# Patient Record
Sex: FEMALE | Race: WHITE
[De-identification: names, ages, dates, MRNs, and addresses within clinical notes are randomized per-mention and may not be internally consistent; named-entity substitution may affect disease eponyms.]

---

## 2020-12-15 ENCOUNTER — HOSPITAL ENCOUNTER (OUTPATIENT)
Dept: HOSPITAL 47 - RADMAMWWP | Age: 60
Discharge: HOME | End: 2020-12-15
Attending: FAMILY MEDICINE
Payer: COMMERCIAL

## 2020-12-15 DIAGNOSIS — Z12.31: Primary | ICD-10-CM

## 2020-12-15 DIAGNOSIS — Z80.3: ICD-10-CM

## 2020-12-15 PROCEDURE — 77063 BREAST TOMOSYNTHESIS BI: CPT

## 2020-12-15 PROCEDURE — 77067 SCR MAMMO BI INCL CAD: CPT

## 2020-12-16 NOTE — MM
Reason for exam: screening  (asymptomatic).

Last mammogram was performed 6 years ago.



History:

Patient is postmenopausal.

Family history of breast cancer in mother at age 45.



Physical Findings:

A clinical breast exam by your physician is recommended on an annual basis and 

results should be correlated with mammographic findings.



MG 3D Screening Mammo W/Cad

Bilateral CC and MLO view(s) were taken.

Prior study comparison: December 3, 2014, bilateral MG screening mammo w CAD.

There are scattered fibroglandular densities.

Finding: There is a 5 mm circumscribed oval mass located 7 cm from the nipple in 

the upper outer quadrant, middle position of the right breast CC 25/82 and MLO 

27/89. There is a chronic nodularity in the right breast. Focal asymmetry middle 

depth upper outer quadrant left breast.

New finding since December 3, 2014.





ASSESSMENT: Incomplete: need additional imaging evaluation, BI-RAD 0



RECOMMENDATION:

Ultrasound of both breasts.



[facility] will attempt to contact patient  to return for ultrasound.
normal...

## 2020-12-18 ENCOUNTER — HOSPITAL ENCOUNTER (OUTPATIENT)
Dept: HOSPITAL 47 - RADUSWWP | Age: 60
Discharge: HOME | End: 2020-12-18
Attending: FAMILY MEDICINE
Payer: COMMERCIAL

## 2020-12-18 DIAGNOSIS — R92.8: Primary | ICD-10-CM

## 2020-12-18 NOTE — USB
Reason for exam: additional evaluation requested from abnormal screening.



History:

Patient is postmenopausal.

Family history of breast cancer in mother at age 45.



Physical Findings:

Nurse did not find any significant physical abnormalities on exam.



US Breast Workup Limited FEROZ

Right limited breast ultrasound including focal area of concern, retroareolar and 

axilla demonstrates a 4 x 2 x 4mm oval, cystic lesion at 10 o'clock and a 3mm oval

lymph node at 11 o'clock.



Left limited breast ultrasound including focal area of concern, retroareolar and 

axilla demonstrates lymph nodes at the axilla 7mm in size.



These results were verbally communicated with the patient and result sheet given 

to the patient on 12/18/20.





ASSESSMENT: Benign, BI-RAD 2



RECOMMENDATION:

Follow-up diagnostic mammogram of both breasts in 6 months.

## 2024-08-11 ENCOUNTER — HOSPITAL ENCOUNTER (EMERGENCY)
Dept: HOSPITAL 47 - EC | Age: 64
LOS: 1 days | Discharge: HOME | End: 2024-08-12
Payer: MEDICARE

## 2024-08-11 PROCEDURE — 96376 TX/PRO/DX INJ SAME DRUG ADON: CPT

## 2024-08-11 PROCEDURE — 99283 EMERGENCY DEPT VISIT LOW MDM: CPT

## 2024-08-11 PROCEDURE — 96361 HYDRATE IV INFUSION ADD-ON: CPT

## 2024-08-11 PROCEDURE — 96375 TX/PRO/DX INJ NEW DRUG ADDON: CPT

## 2024-08-11 PROCEDURE — 96374 THER/PROPH/DIAG INJ IV PUSH: CPT
